# Patient Record
Sex: FEMALE | Race: WHITE | NOT HISPANIC OR LATINO | URBAN - METROPOLITAN AREA
[De-identification: names, ages, dates, MRNs, and addresses within clinical notes are randomized per-mention and may not be internally consistent; named-entity substitution may affect disease eponyms.]

---

## 2018-03-08 ENCOUNTER — EMERGENCY (EMERGENCY)
Facility: HOSPITAL | Age: 18
LOS: 1 days | Discharge: HOME | End: 2018-03-08
Attending: EMERGENCY MEDICINE | Admitting: EMERGENCY MEDICINE
Payer: COMMERCIAL

## 2018-03-08 VITALS
HEART RATE: 89 BPM | RESPIRATION RATE: 17 BRPM | OXYGEN SATURATION: 100 % | SYSTOLIC BLOOD PRESSURE: 129 MMHG | TEMPERATURE: 99 F | DIASTOLIC BLOOD PRESSURE: 85 MMHG

## 2018-03-08 PROCEDURE — 28190 REMOVAL OF FOOT FOREIGN BODY: CPT | Mod: RT

## 2018-03-08 PROCEDURE — 90471 IMMUNIZATION ADMIN: CPT

## 2018-03-08 PROCEDURE — 28190 REMOVAL OF FOOT FOREIGN BODY: CPT

## 2018-03-08 PROCEDURE — 99283 EMERGENCY DEPT VISIT LOW MDM: CPT | Mod: 25

## 2018-03-08 PROCEDURE — 90715 TDAP VACCINE 7 YRS/> IM: CPT

## 2018-03-08 RX ORDER — TETANUS TOXOID, REDUCED DIPHTHERIA TOXOID AND ACELLULAR PERTUSSIS VACCINE, ADSORBED 5; 2.5; 8; 8; 2.5 [IU]/.5ML; [IU]/.5ML; UG/.5ML; UG/.5ML; UG/.5ML
0.5 SUSPENSION INTRAMUSCULAR ONCE
Qty: 0 | Refills: 0 | Status: COMPLETED | OUTPATIENT
Start: 2018-03-08 | End: 2018-03-08

## 2018-03-08 RX ORDER — NORGESTIMATE AND ETHINYL ESTRADIOL 7DAYSX3 LO
0 KIT ORAL
Qty: 0 | Refills: 0 | COMMUNITY

## 2018-03-08 RX ADMIN — Medication 600 MILLIGRAM(S): at 22:19

## 2018-03-08 RX ADMIN — TETANUS TOXOID, REDUCED DIPHTHERIA TOXOID AND ACELLULAR PERTUSSIS VACCINE, ADSORBED 0.5 MILLILITER(S): 5; 2.5; 8; 8; 2.5 SUSPENSION INTRAMUSCULAR at 22:20

## 2018-03-08 NOTE — ED PROVIDER NOTE - PROGRESS NOTE DETAILS
Small piece of gravel removed from wound, tolerated well, see procedure note for details. Rx for abx sent to pharmacy, given strict return precautions. Ambulatory. WIll dc home.

## 2018-03-08 NOTE — ED ADULT NURSE NOTE - OBJECTIVE STATEMENT
2 days ago she had her right large toe stepped on by a woman in high heels.  she went to [facility] today and they foun a foreign body in her right large toe.  she also has a wounf there.

## 2018-03-08 NOTE — ED PROVIDER NOTE - LOWER EXTREMITY EXAM, RIGHT
swelling, erythema, tenderness, able to range toe with pain, no drainage, grossly cellulitic no crepitus

## 2018-03-08 NOTE — ED PROCEDURE NOTE - PROCEDURE ADDITIONAL DETAILS
18F with injury to R foot, xray from urgent care showing foreign body. Partial digital block placed with 1% lidocaine, wound explored and small piece of gravel removed. Tolerated well.

## 2018-03-08 NOTE — ED PROVIDER NOTE - MEDICAL DECISION MAKING DETAILS
18F with cellulitis to area of injury. WIll update tetanus, give abx. Upon review of urgent care xrays, appears to be a small, superficial foreign body. Place digital block and open area to remove, podiatry f/u. 18F with cellulitis to area of injury. WIll update tetanus, give abx. Upon review of urgent care xrays, appears to be a small, superficial foreign body. Place digital block and open area to remove, podiatry f/u.  MD Willie,Attending: pt seen. agree with above HPI/ROS/PE. after xrays reviewed, toe hemiblock done on great toe and small ? gravel foreign body removed with scant surrounding pus. Clindamycin po given. Wound dressed. For d/c home on Clindamycin 600 mg po TIDx7 days/elevation/hot soaks and NWB next 24-48 hours. Strict return precautions. Recheck by PMD 2 days.

## 2018-03-08 NOTE — ED PROVIDER NOTE - OBJECTIVE STATEMENT
18F no PMH sent from urgent care for evaluation of foreign body in R foot. 2 nights ago pt's friend wearing high heels stepped on her bare foot accidentially. +wound that has become progressively more swollen and erythematous. No pus drainage. No fever/chills. Able to ambulate but with pain. Pain and swelling to entire medial MTP joint area. XR at urgent care showed possible foreign body so sent to ED for evaluation. Unknown last tetanus.

## 2018-03-08 NOTE — ED ADULT NURSE NOTE - CHIEF COMPLAINT
The patient is a 18y Female complaining of The patient is a 18y Female complaining of right foot injury

## 2018-03-09 ENCOUNTER — EMERGENCY (EMERGENCY)
Facility: HOSPITAL | Age: 18
LOS: 1 days | Discharge: HOME | End: 2018-03-09
Attending: EMERGENCY MEDICINE
Payer: COMMERCIAL

## 2018-03-09 VITALS
TEMPERATURE: 98 F | SYSTOLIC BLOOD PRESSURE: 134 MMHG | WEIGHT: 139.99 LBS | DIASTOLIC BLOOD PRESSURE: 81 MMHG | OXYGEN SATURATION: 99 % | RESPIRATION RATE: 16 BRPM | HEART RATE: 82 BPM

## 2018-03-09 PROCEDURE — 99283 EMERGENCY DEPT VISIT LOW MDM: CPT

## 2018-03-09 PROCEDURE — 99284 EMERGENCY DEPT VISIT MOD MDM: CPT

## 2018-03-09 RX ORDER — FAMOTIDINE 10 MG/ML
40 INJECTION INTRAVENOUS ONCE
Qty: 0 | Refills: 0 | Status: COMPLETED | OUTPATIENT
Start: 2018-03-09 | End: 2018-03-09

## 2018-03-09 RX ORDER — LIDOCAINE 4 G/100G
5 CREAM TOPICAL ONCE
Qty: 0 | Refills: 0 | Status: COMPLETED | OUTPATIENT
Start: 2018-03-09 | End: 2018-03-09

## 2018-03-09 RX ADMIN — FAMOTIDINE 40 MILLIGRAM(S): 10 INJECTION INTRAVENOUS at 12:25

## 2018-03-09 RX ADMIN — Medication 30 MILLILITER(S): at 12:25

## 2018-03-09 RX ADMIN — LIDOCAINE 5 MILLILITER(S): 4 CREAM TOPICAL at 12:25

## 2018-03-09 NOTE — ED PROVIDER NOTE - OBJECTIVE STATEMENT
Pt seen by me last noc with cellulitis and scant abscess R great toe with tiny people FB removed. Injury due to being steeped on by high heeled shoe heel 3 days ago. t was placed on Clindamycin with first dose given here last night without incident /adverse event. This AM took dose number 2 and developed burring sensation in chest--meds taken without food. Sxs greatly improved now. No sxs suggestive of allergic reaction such as rash/wheeze/swelling of tongue/throat/SOB/cough/dizziness/. No fever/chills. No worsening of toe pain/swelling.  PMHx/Meds none  All: Azithromycin/Augmentin

## 2018-03-09 NOTE — ED PROVIDER NOTE - MEDICAL DECISION MAKING DETAILS
MD Willie,Attending: pt seen and examined, L foot wound milidly improved since visit last night. Sxs suggestive of irritant esophagitis form Clindamycin. No S&Sxs to suggest allergic reaction. Pt asked about change in abx. As she has multiple antibiotc allergies prefer to remail on Clindmaycin but take with food. Will give GI cocktail now and Pepcid and keep on pepcid during abx course (6 more days). Return precautions given for wound. Recheck by PMD Monday AM. return fo any other concerns and continue abx as directed

## 2018-03-09 NOTE — ED ADULT NURSE NOTE - OBJECTIVE STATEMENT
pt was seen yesterday and had a foreign body removed from her right great toe.  she was placed on clindamycin and is here now for gastric irritation from the medication.  toe is not reddened and pulses and refill are wdl

## 2018-03-09 NOTE — ED PROVIDER NOTE - PHYSICAL EXAMINATION
L great toe with open wound (small) along medial aspect at MP joing. small skin flap, scant yellow discharge on dressing--no other active discharge. Mild swelling and erythema and tenderness(mildly improved form last noght. Distally NVI

## 2018-03-09 NOTE — ED POST DISCHARGE NOTE - RESULT SUMMARY
Received call from pt and pts mother (Meghan). Pt took cleocin and had acute onset shortness of breath and chest pain minutes afterwards that now resolved. Pt inquiring about alternate medication. Risks and benefits discussed for emergent evaluation of chest pain. Pt encouraged to immediately return to ER for evaluation. Pt voiced understanding.

## 2019-06-06 ENCOUNTER — FORM ENCOUNTER (OUTPATIENT)
Age: 19
End: 2019-06-06

## 2019-06-19 ENCOUNTER — RESULT REVIEW (OUTPATIENT)
Age: 19
End: 2019-06-19

## 2022-08-17 ENCOUNTER — APPOINTMENT (OUTPATIENT)
Dept: OBGYN | Facility: CLINIC | Age: 22
End: 2022-08-17

## 2022-08-17 VITALS
DIASTOLIC BLOOD PRESSURE: 78 MMHG | OXYGEN SATURATION: 99 % | RESPIRATION RATE: 12 BRPM | BODY MASS INDEX: 24.75 KG/M2 | HEART RATE: 70 BPM | HEIGHT: 64 IN | SYSTOLIC BLOOD PRESSURE: 118 MMHG | WEIGHT: 145 LBS

## 2022-08-17 DIAGNOSIS — F41.9 ANXIETY DISORDER, UNSPECIFIED: ICD-10-CM

## 2022-08-17 DIAGNOSIS — Z78.9 OTHER SPECIFIED HEALTH STATUS: ICD-10-CM

## 2022-08-17 DIAGNOSIS — Z72.89 OTHER PROBLEMS RELATED TO LIFESTYLE: ICD-10-CM

## 2022-08-17 DIAGNOSIS — Z86.39 PERSONAL HISTORY OF OTHER ENDOCRINE, NUTRITIONAL AND METABOLIC DISEASE: ICD-10-CM

## 2022-08-17 DIAGNOSIS — F32.A ANXIETY DISORDER, UNSPECIFIED: ICD-10-CM

## 2022-08-17 DIAGNOSIS — Z01.419 ENCOUNTER FOR GYNECOLOGICAL EXAMINATION (GENERAL) (ROUTINE) W/OUT ABNORMAL FINDINGS: ICD-10-CM

## 2022-08-17 LAB
BILIRUB UR QL STRIP: NORMAL
CLARITY UR: CLEAR
COLLECTION METHOD: NORMAL
GLUCOSE UR-MCNC: NORMAL
HCG UR QL: 1 EU/DL
HCG UR QL: NEGATIVE
HGB UR QL STRIP.AUTO: NORMAL
KETONES UR-MCNC: NORMAL
LEUKOCYTE ESTERASE UR QL STRIP: NORMAL
NITRITE UR QL STRIP: NORMAL
PH UR STRIP: 5
PROT UR STRIP-MCNC: NORMAL
SP GR UR STRIP: 1015

## 2022-08-17 PROCEDURE — 81002 URINALYSIS NONAUTO W/O SCOPE: CPT

## 2022-08-17 PROCEDURE — 81025 URINE PREGNANCY TEST: CPT

## 2022-08-17 PROCEDURE — 99395 PREV VISIT EST AGE 18-39: CPT | Mod: 25

## 2022-08-17 PROCEDURE — G0444 DEPRESSION SCREEN ANNUAL: CPT | Mod: 59

## 2022-08-17 NOTE — PLAN
[FreeTextEntry1] : The risks, benefits and alternatives to an OCP were discussed and all questions were answered.  Currently there are no contraindications to taking hormonal birthcontrol.  Instructions for usage and ASA for travel were discussed.\par Please schedule a follow-up appointment in 6 months.\par STD prevention was discussed with the patient.\par Risks, benefits, alternatives of hormonal contraceptive use were discussed with the patient including but not limited to risk of contraceptive failure, deep venous thrombosis or embolism, cardiovascular disease, sexually transmitted disease transmission without use of barrier method.\par Self breast exam instructions were discussed with the patient.\par Risks, benefits and alternatives of IUD contraception were discussed, including risks of infertility, abnormal bleeding, uterine perforation, infection, pregnancy, and ectopic pregnancy.  \par IUD is usually inserted during or just after a period and may be accompanied by pelvic pain and cramps.\par IUD string should be checked by the patient monthly and patient is to return after the first period following insertion for pelvic examination.\par Mirena IUD is effective for 5 years and ParaGard IUD effective for 10 yrs.\par \par

## 2022-08-17 NOTE — PHYSICAL EXAM
[Appropriately responsive] : appropriately responsive [Alert] : alert [No Acute Distress] : no acute distress [No Lymphadenopathy] : no lymphadenopathy [Regular Rate Rhythm] : regular rate rhythm [No Murmurs] : no murmurs [Clear to Auscultation B/L] : clear to auscultation bilaterally [Soft] : soft [Non-tender] : non-tender [Non-distended] : non-distended [No HSM] : No HSM [No Lesions] : no lesions [No Mass] : no mass [Oriented x3] : oriented x3 [Examination Of The Breasts] : a normal appearance [No Masses] : no breast masses were palpable [Labia Majora] : normal [Labia Minora] : normal [Discharge] : a  ~M vaginal discharge was present [Normal] : normal [Uterine Adnexae] : normal [FreeTextEntry4] : Candida present

## 2022-08-17 NOTE — HISTORY OF PRESENT ILLNESS
[Y] : Patient uses contraception [N] : Patient denies prior pregnancies [Regular Cycle Intervals] : periods have been regular [Menarche Age: ____] : age at menarche was [unfilled] [No] : Patient does not have concerns regarding sex [Previously active] : previously active [Men] : men [Yes] : Yes [Condoms] : Condoms [TextBox_4] : annual [Mammogramdate] : n/a [BreastSonogramDate] : n/a [PapSmeardate] : 01/20 [BoneDensityDate] : n/a [ColonoscopyDate] : n/a [TextBox_78] : no HX [LMPDate] : 08/14/22 [MensesFreq] : 30 [MensesLength] : 7 [PGHxTotal] : 0 [TextBox_6] : 08/14/22 [TextBox_9] : 13 [FreeTextEntry1] : 08/14/22 [FreeTextEntry3] : pills

## 2022-08-22 LAB — CYTOLOGY CVX/VAG DOC THIN PREP: ABNORMAL

## 2022-10-10 ENCOUNTER — APPOINTMENT (OUTPATIENT)
Dept: OBGYN | Facility: CLINIC | Age: 22
End: 2022-10-10

## 2022-10-10 VITALS
BODY MASS INDEX: 24.75 KG/M2 | HEART RATE: 85 BPM | WEIGHT: 145 LBS | SYSTOLIC BLOOD PRESSURE: 112 MMHG | OXYGEN SATURATION: 97 % | DIASTOLIC BLOOD PRESSURE: 70 MMHG | RESPIRATION RATE: 12 BRPM | HEIGHT: 64 IN

## 2022-10-10 DIAGNOSIS — N76.0 ACUTE VAGINITIS: ICD-10-CM

## 2022-10-10 DIAGNOSIS — B96.89 ACUTE VAGINITIS: ICD-10-CM

## 2022-10-10 LAB
HCG UR QL: NEGATIVE
QUALITY CONTROL: YES

## 2022-10-10 PROCEDURE — 81025 URINE PREGNANCY TEST: CPT

## 2022-10-10 PROCEDURE — 58300 INSERT INTRAUTERINE DEVICE: CPT

## 2022-10-10 NOTE — PROCEDURE
[IUD Placement] : intrauterine device (IUD) placement [Time out performed] : Pre-procedure time out performed.  Patient's name, date of birth and procedure confirmed. [Consent Obtained] : Consent obtained [Prevention of Pregnancy] : prevention of pregnancy [Risks] : risks [Benefits] : benefits [Alternatives] : alternatives [Patient] : patient [Infection] : infection [Bleeding] : bleeding [Pain] : pain [Expulsion] : expulsion [Failure] : failure [Uterine Perforation] : uterine perforation [Neg Pregnancy Test] : negative pregnancy test [Ibuprofen ___ mg] : ibuprofen [unfilled] ~Umg [Cytotec] : Cytotec [Betadine] : Betadine [Tenaculum] : Tenaculum [Easy Passage] : Easy passage [Mirena IUD] : Mirena IUD [Tolerated Well] : Patient tolerated the procedure well [No Complications] : No complications [Motrin/Ibuprofen] : Motrin/Ibuprofen [LMPDate] : 10/6/22 [de-identified] : [id number] [de-identified] : 6/2024 [de-identified] : 10/2030

## 2022-10-10 NOTE — PROCEDURE
[IUD Placement] : intrauterine device (IUD) placement [Time out performed] : Pre-procedure time out performed.  Patient's name, date of birth and procedure confirmed. [Consent Obtained] : Consent obtained [Prevention of Pregnancy] : prevention of pregnancy [Risks] : risks [Benefits] : benefits [Alternatives] : alternatives [Patient] : patient [Infection] : infection [Bleeding] : bleeding [Pain] : pain [Expulsion] : expulsion [Failure] : failure [Uterine Perforation] : uterine perforation [Neg Pregnancy Test] : negative pregnancy test [Ibuprofen ___ mg] : ibuprofen [unfilled] ~Umg [Cytotec] : Cytotec [Betadine] : Betadine [Tenaculum] : Tenaculum [Easy Passage] : Easy passage [Mirena IUD] : Mirena IUD [Tolerated Well] : Patient tolerated the procedure well [No Complications] : No complications [Motrin/Ibuprofen] : Motrin/Ibuprofen [LMPDate] : 10/6/22 [de-identified] : [id number] [de-identified] : 6/2024 [de-identified] : 10/2030

## 2022-10-12 ENCOUNTER — NON-APPOINTMENT (OUTPATIENT)
Age: 22
End: 2022-10-12

## 2022-10-12 LAB
CANDIDA VAG CYTO: DETECTED
G VAGINALIS+PREV SP MTYP VAG QL MICRO: DETECTED
T VAGINALIS VAG QL WET PREP: NOT DETECTED

## 2022-10-13 LAB — BACTERIA GENITAL AEROBE CULT: ABNORMAL

## 2022-11-11 ENCOUNTER — APPOINTMENT (OUTPATIENT)
Dept: OBGYN | Facility: CLINIC | Age: 22
End: 2022-11-11

## 2022-11-11 PROCEDURE — 76830 TRANSVAGINAL US NON-OB: CPT

## 2023-02-28 ENCOUNTER — RX RENEWAL (OUTPATIENT)
Age: 23
End: 2023-02-28

## 2023-02-28 DIAGNOSIS — N76.0 ACUTE VAGINITIS: ICD-10-CM

## 2023-02-28 DIAGNOSIS — B96.89 ACUTE VAGINITIS: ICD-10-CM

## 2023-04-07 ENCOUNTER — APPOINTMENT (OUTPATIENT)
Dept: OBGYN | Facility: CLINIC | Age: 23
End: 2023-04-07
Payer: COMMERCIAL

## 2023-04-07 VITALS
DIASTOLIC BLOOD PRESSURE: 60 MMHG | SYSTOLIC BLOOD PRESSURE: 92 MMHG | HEART RATE: 86 BPM | RESPIRATION RATE: 14 BRPM | OXYGEN SATURATION: 99 % | HEIGHT: 64 IN | WEIGHT: 135 LBS | BODY MASS INDEX: 23.05 KG/M2

## 2023-04-07 LAB
HCG UR QL: NEGATIVE
QUALITY CONTROL: YES

## 2023-04-07 PROCEDURE — 99213 OFFICE O/P EST LOW 20 MIN: CPT | Mod: 25

## 2023-04-07 PROCEDURE — 58301 REMOVE INTRAUTERINE DEVICE: CPT

## 2023-04-07 PROCEDURE — 81025 URINE PREGNANCY TEST: CPT

## 2023-04-14 LAB — CORE LAB BIOPSY: NORMAL

## 2023-06-29 ENCOUNTER — RX RENEWAL (OUTPATIENT)
Age: 23
End: 2023-06-29

## 2023-06-29 DIAGNOSIS — B96.89 ACUTE VAGINITIS: ICD-10-CM

## 2023-06-29 DIAGNOSIS — N76.0 ACUTE VAGINITIS: ICD-10-CM

## 2023-11-02 DIAGNOSIS — Z11.3 ENCOUNTER FOR SCREENING FOR INFECTIONS WITH A PREDOMINANTLY SEXUAL MODE OF TRANSMISSION: ICD-10-CM

## 2023-12-27 ENCOUNTER — APPOINTMENT (OUTPATIENT)
Dept: OBGYN | Facility: CLINIC | Age: 23
End: 2023-12-27
Payer: COMMERCIAL

## 2023-12-27 VITALS
OXYGEN SATURATION: 98 % | HEIGHT: 64 IN | SYSTOLIC BLOOD PRESSURE: 102 MMHG | BODY MASS INDEX: 24.75 KG/M2 | DIASTOLIC BLOOD PRESSURE: 64 MMHG | HEART RATE: 98 BPM | WEIGHT: 145 LBS

## 2023-12-27 DIAGNOSIS — Z30.432 ENCOUNTER FOR REMOVAL OF INTRAUTERINE CONTRACEPTIVE DEVICE: ICD-10-CM

## 2023-12-27 DIAGNOSIS — B37.31 ACUTE CANDIDIASIS OF VULVA AND VAGINA: ICD-10-CM

## 2023-12-27 DIAGNOSIS — Z12.4 ENCOUNTER FOR SCREENING FOR MALIGNANT NEOPLASM OF CERVIX: ICD-10-CM

## 2023-12-27 DIAGNOSIS — Z30.41 ENCOUNTER FOR SURVEILLANCE OF CONTRACEPTIVE PILLS: ICD-10-CM

## 2023-12-27 DIAGNOSIS — Z30.09 ENCOUNTER FOR OTHER GENERAL COUNSELING AND ADVICE ON CONTRACEPTION: ICD-10-CM

## 2023-12-27 DIAGNOSIS — Z01.419 ENCOUNTER FOR GYNECOLOGICAL EXAMINATION (GENERAL) (ROUTINE) W/OUT ABNORMAL FINDINGS: ICD-10-CM

## 2023-12-27 DIAGNOSIS — Z13.31 ENCOUNTER FOR SCREENING FOR DEPRESSION: ICD-10-CM

## 2023-12-27 DIAGNOSIS — Z12.39 ENCOUNTER FOR OTHER SCREENING FOR MALIGNANT NEOPLASM OF BREAST: ICD-10-CM

## 2023-12-27 DIAGNOSIS — Z30.430 ENCOUNTER FOR INSERTION OF INTRAUTERINE CONTRACEPTIVE DEVICE: ICD-10-CM

## 2023-12-27 DIAGNOSIS — L70.9 ACNE, UNSPECIFIED: ICD-10-CM

## 2023-12-27 PROCEDURE — 99395 PREV VISIT EST AGE 18-39: CPT

## 2023-12-27 RX ORDER — METRONIDAZOLE 500 MG/1
500 TABLET ORAL TWICE DAILY
Qty: 14 | Refills: 0 | Status: COMPLETED | COMMUNITY
Start: 2022-12-16 | End: 2023-12-27

## 2023-12-27 RX ORDER — TRETINOIN 0.25 MG/G
0.03 CREAM TOPICAL
Qty: 1 | Refills: 1 | Status: ACTIVE | COMMUNITY
Start: 2022-11-14 | End: 1900-01-01

## 2023-12-27 RX ORDER — CLINDAMYCIN PHOSPHATE 10 MG/ML
1 LOTION TOPICAL TWICE DAILY
Qty: 1 | Refills: 0 | Status: COMPLETED | COMMUNITY
Start: 2022-08-17 | End: 2023-12-27

## 2023-12-27 RX ORDER — FLUCONAZOLE 150 MG/1
150 TABLET ORAL DAILY
Qty: 2 | Refills: 2 | Status: COMPLETED | COMMUNITY
Start: 2022-08-17 | End: 2023-12-27

## 2023-12-27 RX ORDER — MISOPROSTOL 200 UG/1
200 TABLET ORAL
Qty: 2 | Refills: 0 | Status: COMPLETED | COMMUNITY
Start: 2022-08-17 | End: 2023-12-27

## 2023-12-27 RX ORDER — METRONIDAZOLE 7.5 MG/G
0.75 GEL VAGINAL
Qty: 1 | Refills: 0 | Status: COMPLETED | COMMUNITY
Start: 2022-10-12 | End: 2023-12-27

## 2023-12-27 RX ORDER — KETOCONAZOLE 20 MG/G
2 CREAM TOPICAL TWICE DAILY
Qty: 1 | Refills: 3 | Status: ACTIVE | COMMUNITY
Start: 2023-12-27 | End: 1900-01-01

## 2023-12-27 NOTE — PHYSICAL EXAM
[Chaperone Present] : A chaperone was present in the examining room during all aspects of the physical examination [Appropriately responsive] : appropriately responsive [Alert] : alert [No Acute Distress] : no acute distress [No Lymphadenopathy] : no lymphadenopathy [Soft] : soft [Non-tender] : non-tender [Non-distended] : non-distended [No HSM] : No HSM [No Lesions] : no lesions [No Mass] : no mass [Oriented x3] : oriented x3 [Examination Of The Breasts] : a normal appearance [No Masses] : no breast masses were palpable [Labia Majora] : normal [Labia Minora] : normal [Normal] : normal [Uterine Adnexae] : normal [FreeTextEntry1] : This note was written by Shagufta Berg on 12/27/2023, acting as a scribe for Dr. Michael Smith MD. All medic record entries were at my, Dr. Michael Smith MD, direction and personally dictated by me in 12/27/2023. I have personally reviewed the chart and agree that the record accurately reflects my personal performance of the history, physical exam, assessment, and plan.  [FreeTextEntry5] : yeast infection

## 2023-12-27 NOTE — HISTORY OF PRESENT ILLNESS
[Y] : Patient is sexually active [N] : Patient denies prior pregnancies [LMP unknown] : LMP unknown [unknown] : Patient is unsure of the date of her LMP [Currently Active] : currently active [Condoms] : uses condoms [FreeTextEntry1] : pt is here for annual. [TextBox_4] : The patient presents today for a routine GYN exam. She notes itching in the pelvic area. We reviewed together in detail her past medical and surgical histories, allergies and medication usage, social and family history. All questions were answered in easy to understand language. [PapSmeardate] : 01/22

## 2023-12-27 NOTE — PLAN
[FreeTextEntry1] : 1)  Self breast exam instructions, calcium supplementation discussed with the patient. 2)  Mammography, lipid profile assessment, TSH screening, fasting glucose testing, colonoscopy screening were discussed with the patient.  Vitamin D supplementation 3)  Maintain healthy weight. 4)  Regular health maintenance with PCP. 5)  Remain tobacco free. 6)  Limit alcohol intake to less than 5 drinks per week. 7)  Osteoporosis screening. 8)  Annual cholesterol screening. 9)  Annual influenza vaccine. The importance of routine physical activity was reviewed and a goal of 150 minutes of moderately vigorous exercise per week was endorsed.  Yearly breast cancer screening with no current clinical or radiographic concerns. The patient was reminded regarding future well breast and general healthcare, breast cancer risk reduction, the importance of self-examination and the need for follow up. She was again reminded of the need to take Vit D3 2500 IU daily or to keep a Vit D level above 30, and Tumeric 1000 mg daily with Black Pepper. Plan continued yearly imaging and breast follow up, sooner as needed. I counseled the patient on current recommendations to reduce breast cancer risk including but not inclusive to regular exercise 20-30 minutes 3-4 times a week, low fat diet, limiting alcohol consumption, maintenance of ideal body weight, yearly imaging and self breast awareness. Questions answered. I encouraged in light of Covid 19, social distancing, frequent hand washing and precautions to stay healthy.  I have spent 40 minutes of time on this encounter. Greater than 50% of the face-to-face encounter time was spent on counseling and/or coordination of care for examination, findings, differential, testing, management and planning. 10 minutes were allotted to discussing the depression screening. There have been reports of babies born with birth defects after people used tretinoin on their skin during pregnancy. Usually, a few reports do not cause healthcare providers to worry, but the birth defects reported in these cases are similar to the birth defects seen in babies exposed to isotretinoin during pregnancy. The patient desires a refill of her tretinoin.   Pregnancy concerns discussed.

## 2023-12-28 LAB
SOURCE AMPLIFICATION: NORMAL
T VAGINALIS RRNA SPEC QL NAA+PROBE: NOT DETECTED

## 2024-01-02 LAB — CYTOLOGY CVX/VAG DOC THIN PREP: ABNORMAL

## 2024-01-04 RX ORDER — TERCONAZOLE 8 MG/G
0.8 CREAM VAGINAL
Qty: 1 | Refills: 0 | Status: ACTIVE | COMMUNITY
Start: 2024-01-04 | End: 1900-01-01

## 2024-03-29 ENCOUNTER — APPOINTMENT (OUTPATIENT)
Dept: OBGYN | Facility: CLINIC | Age: 24
End: 2024-03-29
Payer: COMMERCIAL

## 2024-03-29 VITALS
WEIGHT: 135 LBS | RESPIRATION RATE: 16 BRPM | OXYGEN SATURATION: 99 % | SYSTOLIC BLOOD PRESSURE: 90 MMHG | HEIGHT: 64 IN | HEART RATE: 95 BPM | BODY MASS INDEX: 23.05 KG/M2 | DIASTOLIC BLOOD PRESSURE: 70 MMHG

## 2024-03-29 DIAGNOSIS — R87.612 LOW GRADE SQUAMOUS INTRAEPITHELIAL LESION ON CYTOLOGIC SMEAR OF CERVIX (LGSIL): ICD-10-CM

## 2024-03-29 LAB
HCG UR QL: NEGATIVE
QUALITY CONTROL: YES

## 2024-03-29 PROCEDURE — 57454 BX/CURETT OF CERVIX W/SCOPE: CPT

## 2024-03-29 PROCEDURE — 81025 URINE PREGNANCY TEST: CPT

## 2024-03-29 NOTE — PLAN
[FreeTextEntry1] :  A consent was read and signed by the PT prior to the procedure.   CONSENT TO OPERATION, ANESTHESIA, SEDATION, ANALGESIA, SPECIAL TREATMENT, OR PROCEDURE BLOOD TRANFUSION CONSENT Date:________ Time________ AM/PM    1.        Permission: I hereby authorize  _________________ and his/ her associates or assistants of his/ her choice at (the hospital) to perform the following procedure(s): ________________________________________________________________________________________________________________________________________________________   Including such as photographing, videotaping, televising, or other methods of visually recording the procedure(s) as may be purposeful for the advancement of medical knowledge and or education, with the understanding that my (the patients) identity will remain anonymous.    2.        Explanation of procedure(s), risk, benefits, and alternatives.    _________________ has fully explained to me the nature and purpose of the procedure(s) and has also informed me of expected benefits and complications (from known causes), attendant discomforts, and the risk that may arise, as well as possible alternative methods of diagnosis and/or treatment to the proposed procedure(s), including no treatment. I have been given an opportunity to ask questions, and all my questions have been answered fully and satisfactorily.    3.        Anesthesia: I further understand that anesthetics, sedatives, or analgesics as may be considered necessary and the type will be explained to me by the anesthesiologist prior to the surgery.    4.        Specimens: Any organs or tissues surgically removed may vbe examined and retained by the hospital medical, scientific, or educational purposes, and such tissues or part may be disposed of in accordance with customary practices and applicable state laws and regulations.    5.        Understanding of this form: I confirm that I have read this form, fully and understand its contents, and that all blank spaces above have been completed prior to my signing. I acknowledge that no guarantees or assurances have been made to me concerning the results intended from the procedure(s) described above.    _____________________                _________        _______________________________ Patient/Agent/Relative/Guardian        Date           Print Name             Relationship   _____________________                _________      ________________________________ Patient/Agent/Relative/Guardian        Date         Print Name              Relationship    I hereby certify that I have explained the nature, purpose, benefits, and risk of, and alternatives to the operations/procedure(s), have offered to explain any questions, and have fully answered all such questions. I believe that the patient/relative/guardian fully understands what I have explained and answered. In the event that I was not present when the patient signed this form, I understand that the form is only documentation that the informed consent process took place. I remain responsible for having obtained the consent from the patient.   Date: _________________              Physician:_________________________________

## 2024-03-29 NOTE — PROCEDURE
[Consent Obtained] : Consent obtained [Colposcopy] : Colposcopy  [Risks] : risks [Benefits] : benefits [Patient] : patient [Alternatives] : alternatives [ECC Performed] : ECC performed [Hemostasis Obtained] : Hemostasis obtained [Tolerated Well] : the patient tolerated the procedure well [Biopsy] : biopsy taken [Infection] : infection [Time out performed] : Pre-procedure time out performed.  Patient's name, date of birth and procedure confirmed. [Bleeding] : bleeding [Allergic Reaction] : allergic reaction [LGSIL] : LGSIL [No Premedication] : no premedication [SCI Fully Visualized] : SCI fully visualized [Colposcopy Adequate] : colposcopy adequate [No Abnormalities] : no abnormalities [de-identified] : 1 [de-identified] : Normal exam. [de-identified] : 6 o'clock  [de-identified] : Minimal acetowhite epithelium at 6:00 otherwise unremarkable

## 2024-04-08 ENCOUNTER — NON-APPOINTMENT (OUTPATIENT)
Age: 24
End: 2024-04-08

## 2024-04-08 LAB — CORE LAB BIOPSY: NORMAL

## 2024-07-22 DIAGNOSIS — B37.9 CANDIDIASIS, UNSPECIFIED: ICD-10-CM

## 2024-07-22 RX ORDER — FLUCONAZOLE 150 MG/1
150 TABLET ORAL
Qty: 2 | Refills: 0 | Status: ACTIVE | COMMUNITY
Start: 2024-07-22 | End: 1900-01-01

## 2024-11-11 ENCOUNTER — LABORATORY RESULT (OUTPATIENT)
Age: 24
End: 2024-11-11

## 2024-11-11 ENCOUNTER — NON-APPOINTMENT (OUTPATIENT)
Age: 24
End: 2024-11-11

## 2024-11-11 ENCOUNTER — APPOINTMENT (OUTPATIENT)
Dept: OBGYN | Facility: CLINIC | Age: 24
End: 2024-11-11

## 2024-11-11 VITALS
HEIGHT: 64 IN | DIASTOLIC BLOOD PRESSURE: 70 MMHG | OXYGEN SATURATION: 99 % | HEART RATE: 95 BPM | BODY MASS INDEX: 24.75 KG/M2 | SYSTOLIC BLOOD PRESSURE: 110 MMHG | WEIGHT: 145 LBS | RESPIRATION RATE: 14 BRPM

## 2024-11-11 DIAGNOSIS — B37.9 CANDIDIASIS, UNSPECIFIED: ICD-10-CM

## 2024-11-11 DIAGNOSIS — Z12.39 ENCOUNTER FOR OTHER SCREENING FOR MALIGNANT NEOPLASM OF BREAST: ICD-10-CM

## 2024-11-11 DIAGNOSIS — L70.9 ACNE, UNSPECIFIED: ICD-10-CM

## 2024-11-11 DIAGNOSIS — B96.89 ACUTE VAGINITIS: ICD-10-CM

## 2024-11-11 DIAGNOSIS — Z01.419 ENCOUNTER FOR GYNECOLOGICAL EXAMINATION (GENERAL) (ROUTINE) W/OUT ABNORMAL FINDINGS: ICD-10-CM

## 2024-11-11 DIAGNOSIS — N76.0 ACUTE VAGINITIS: ICD-10-CM

## 2024-11-11 DIAGNOSIS — B37.31 ACUTE CANDIDIASIS OF VULVA AND VAGINA: ICD-10-CM

## 2024-11-11 DIAGNOSIS — Z12.4 ENCOUNTER FOR SCREENING FOR MALIGNANT NEOPLASM OF CERVIX: ICD-10-CM

## 2024-11-11 DIAGNOSIS — Z11.3 ENCOUNTER FOR SCREENING FOR INFECTIONS WITH A PREDOMINANTLY SEXUAL MODE OF TRANSMISSION: ICD-10-CM

## 2024-11-11 DIAGNOSIS — Z13.31 ENCOUNTER FOR SCREENING FOR DEPRESSION: ICD-10-CM

## 2024-11-11 LAB
HCG UR QL: NEGATIVE
QUALITY CONTROL: YES

## 2024-11-11 PROCEDURE — G0444 DEPRESSION SCREEN ANNUAL: CPT | Mod: 59

## 2024-11-11 PROCEDURE — 99459 PELVIC EXAMINATION: CPT

## 2024-11-11 PROCEDURE — 81025 URINE PREGNANCY TEST: CPT

## 2024-11-11 PROCEDURE — 99395 PREV VISIT EST AGE 18-39: CPT

## 2024-11-12 LAB
C TRACH RRNA SPEC QL NAA+PROBE: NOT DETECTED
N GONORRHOEA RRNA SPEC QL NAA+PROBE: NOT DETECTED
SOURCE TP AMPLIFICATION: NORMAL

## 2024-11-15 LAB
A VAGINAE DNA VAG QL NAA+PROBE: NORMAL
BVAB2 DNA VAG QL NAA+PROBE: NORMAL
C KRUSEI DNA VAG QL NAA+PROBE: NEGATIVE
C TRACH RRNA SPEC QL NAA+PROBE: NEGATIVE
CANDIDA DNA VAG QL NAA+PROBE: NEGATIVE
CYTOLOGY CVX/VAG DOC THIN PREP: ABNORMAL
MEGA1 DNA VAG QL NAA+PROBE: NORMAL
N GONORRHOEA RRNA SPEC QL NAA+PROBE: NEGATIVE
T VAGINALIS RRNA SPEC QL NAA+PROBE: NEGATIVE

## 2024-12-20 RX ORDER — TERCONAZOLE 4 MG/G
0.4 CREAM VAGINAL
Qty: 1 | Refills: 1 | Status: ACTIVE | COMMUNITY
Start: 2024-12-20 | End: 1900-01-01

## 2024-12-24 PROBLEM — F10.90 ALCOHOL USE: Status: ACTIVE | Noted: 2022-08-17
